# Patient Record
Sex: FEMALE | Race: BLACK OR AFRICAN AMERICAN
[De-identification: names, ages, dates, MRNs, and addresses within clinical notes are randomized per-mention and may not be internally consistent; named-entity substitution may affect disease eponyms.]

---

## 2017-04-03 ENCOUNTER — HOSPITAL ENCOUNTER (EMERGENCY)
Dept: HOSPITAL 61 - ER | Age: 42
Discharge: HOME | End: 2017-04-03
Payer: COMMERCIAL

## 2017-04-03 VITALS — WEIGHT: 210 LBS | HEIGHT: 66.5 IN | BODY MASS INDEX: 33.35 KG/M2

## 2017-04-03 VITALS — SYSTOLIC BLOOD PRESSURE: 130 MMHG | DIASTOLIC BLOOD PRESSURE: 85 MMHG
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
## 2017-04-03 DIAGNOSIS — R05: ICD-10-CM

## 2017-04-03 DIAGNOSIS — J39.2: ICD-10-CM

## 2017-04-03 DIAGNOSIS — T54.91XA: Primary | ICD-10-CM

## 2017-04-03 DIAGNOSIS — R09.81: ICD-10-CM

## 2017-04-03 PROCEDURE — 94640 AIRWAY INHALATION TREATMENT: CPT

## 2017-04-03 PROCEDURE — 99283 EMERGENCY DEPT VISIT LOW MDM: CPT

## 2017-04-03 NOTE — PHYS DOC
Past Medical History


Past Medical History:  Depression


Past Surgical History:  , Other


Additional Past Surgical Histo:  right wrist


Alcohol Use:  None


Drug Use:  None





Adult General


Chief Complaint


Chief Complaint:  COUGH





HPI


HPI


Patient is a 41  year old female who presents emergency room with complaint of 

cough, nasal congestion and throat irritation that seemed to worsen yesterday 

after being exposed to both bleach and ammonia in the home environment which 

one nose or open. Patient states that she has been performing some cleaning 

task at home, initially with bleach and then with ammonia. She denies mixing 

them directly together. However, she did use the same type of a  machine 

but flushed out the bleach solution before using the ammonia. Patient self 

denies any history of heart or lung disease. She denies any sensations of 

swelling in the back of her throat, difficulty swallowing or change in her 

speech.





Review of Systems


Review of Systems





Constitutional: Denies fever or chills []


Eyes: Denies change in visual acuity, redness, or eye pain []


HENT: Denies nasal congestion or sore throat []


Respiratory: Denies cough or shortness of breath []


Cardiovascular: No additional information not addressed in HPI []


GI: Denies abdominal pain, nausea, vomiting, bloody stools or diarrhea []


: Denies dysuria or hematuria []


Musculoskeletal: Denies back pain or joint pain []


Integument: Denies rash or skin lesions []


Neurologic: Denies headache, focal weakness or sensory changes []


Endocrine: Denies polyuria or polydipsia []





Current Medications


Current Medications








 Current Medications








 Medications


  (Trade)  Dose


 Ordered  Sig/Tristian  Start Time


 Stop Time Status Last Admin


Dose Admin


 


 Albuterol Sulfate


  (Ventolin Neb


 Soln)  2.5 mg  1X  ONCE  4/3/17 17:45


 4/3/17 17:46 DC 4/3/17 17:56


2.5 MG


 


 Prednisone


  (Prednisone)  40 mg  1X  ONCE  4/3/17 17:45


 4/3/17 17:46 DC 4/3/17 17:45


40 MG














Allergies


Allergies





 Allergies








Coded Allergies Type Severity Reaction Last Updated Verified


 


  No Known Drug Allergies    14 No











Physical Exam


Physical Exam





Constitutional: Well developed, well nourished, no acute distress, non-toxic 

appearance. []


HENT: Normocephalic, atraumatic, bilateral external ears normal, oropharynx 

moist, no oral exudates, nose normal. No angioedema


Eyes: PERRLA, EOMI, conjunctiva normal, no discharge. [] 


Neck: Normal range of motion, no tenderness, supple, no stridor. 


Cardiovascular:Heart rate regular rhythm, no murmur []


Lungs & Thorax: There is no respiratory distress respiratory fatigue. There is 

no posturing or sensory muscle use. Lungs are clear to auscultation bilaterally.


Abdomen: Bowel sounds normal, soft, no tenderness, no masses, no pulsatile 

masses. [] 


Skin: Warm, dry, no erythema, no rash.  


Back: No tenderness, no CVA tenderness. [] 


Extremities: No tenderness, no cyanosis, no clubbing, ROM intact, no edema. [] 


Neurologic: Alert and oriented X 3, normal motor function, normal sensory 

function, no focal deficits noted. []


Psychologic: Affect normal, judgement normal, mood normal. []





Current Patient Data


Vital Signs





 Vital Signs








  Date Time  Temp Pulse Resp B/P Pulse Ox O2 Delivery O2 Flow Rate FiO2


 


4/3/17 17:55      Room Air  


 


4/3/17 17:00 99.7 107 16 130/85 99   





 99.7       











EKG


EKG


[]





Radiology/Procedures


Radiology/Procedures


[]





Course & Med Decision Making


Course & Med Decision Making


Pertinent Labs and Imaging studies reviewed. (See chart for details)





[]





Dragon Disclaimer


Dragon Disclaimer


This electronic medical record was generated, in whole or in part, using a 

voice recognition dictation system.





Departure


Departure


Impression:  


 Primary Impression:  


 Inhalation of gaseous substance


Disposition:   HOME, SELF-CARE


Condition:  GOOD


Referrals:  


NO PCP (PCP)


Patient Instructions:  Chemical Inhalation





Additional Instructions:


1. There is no evidence of damage to his lungs at this time. The irritation is 

in your upper respiratory tract.


2. Take the medication as prescribed. Review the discharge instructions for self

-care and reasons to return to the emergency department.


3. Follow-up with your primary care doctor within the next 3-4 days, 

particularly if there are any concerns.


Scripts


Albuterol Sulfate (Proair Hfa Inhaler)8.5 Gm Hfa.aer.ad1 Puff INH PRN Q6HRS PRN 

SHORTNESS OF BREATH #1 INHALER  Ref 0


   Prov:WALTER SABILLON         4/3/17


Prednisone 20 Mg Tablet2 Tab PO DAILY 5 Days


   Prov:WALTER SABILLON         4/3/17


D-Methorphan Hb/Prometh Hcl (Promethazine-Dm Syrup)118 Ml Syrup5 Ml PO PRN 

Q6HRS COUGH #120 ML


   Prov:WALTER SABILLON         4/3/17








WALTER SABILLON Apr 3, 2017 17:32

## 2019-03-22 ENCOUNTER — HOSPITAL ENCOUNTER (EMERGENCY)
Dept: HOSPITAL 61 - ER | Age: 44
Discharge: HOME | End: 2019-03-22
Payer: COMMERCIAL

## 2019-03-22 VITALS — DIASTOLIC BLOOD PRESSURE: 73 MMHG | SYSTOLIC BLOOD PRESSURE: 120 MMHG

## 2019-03-22 VITALS — BODY MASS INDEX: 27.32 KG/M2 | HEIGHT: 66 IN | WEIGHT: 170 LBS

## 2019-03-22 DIAGNOSIS — Z98.890: ICD-10-CM

## 2019-03-22 DIAGNOSIS — F32.9: ICD-10-CM

## 2019-03-22 DIAGNOSIS — M54.5: Primary | ICD-10-CM

## 2019-03-22 PROCEDURE — 99283 EMERGENCY DEPT VISIT LOW MDM: CPT

## 2019-03-22 PROCEDURE — 96372 THER/PROPH/DIAG INJ SC/IM: CPT

## 2019-03-22 NOTE — PHYS DOC
Past Medical History


Past Medical History:  Depression


Past Surgical History:  , Other


Additional Past Surgical Histo:  right wrist


Alcohol Use:  None


Drug Use:  None





Adult General


Chief Complaint


Chief Complaint:  BACK PAIN - NO INJURY





[facility]


HPI





Patient is a 43-year-old female who presents with complaint of right-sided 

lower back pain that started yesterday. Patient states that pain started while 

she was just standing up. She states it felt like muscle twinge in her back and 

almost feels like she has a back spasm. She denies any injury and was not 

lifting when pain started. She denies any urinary discomfort. She denies any 

nausea, vomiting or diarrhea. Moderate. She states the pain is worsened with 

bending over in certain movements.





Review of Systems


Review of Systems





Constitutional: Denies fever or chills []


Respiratory: Denies cough or shortness of breath []


Cardiovascular: No additional information not addressed in HPI []


GI: Denies abdominal pain, nausea, vomiting, bloody stools or diarrhea []


: Denies dysuria or hematuria []


Musculoskeletal: Complains of lower back pain []


Integument: Denies rash or skin lesions []





Current Medications


Current Medications





Current Medications








 Medications


  (Trade)  Dose


 Ordered  Sig/Tristian  Start Time


 Stop Time Status Last Admin


Dose Admin


 


 Ketorolac


 Tromethamine


  (Toradol Im)  60 mg  1X  ONCE  3/22/19 03:45


 3/22/19 03:46 DC 3/22/19 04:17


60 MG


 


 Orphenadrine


 Citrate


  (Norflex)  60 mg  1X  ONCE  3/22/19 04:00


 3/22/19 04:01 DC 3/22/19 04:17


60 MG











Allergies


Allergies





Allergies








Coded Allergies Type Severity Reaction Last Updated Verified


 


  No Known Drug Allergies    14 No











Physical Exam


Physical Exam





Constitutional: Well developed, well nourished, no acute distress, non-toxic 

appearance. []


Neck: Normal range of motion, no tenderness, supple, no stridor. [] 


Cardiovascular: Regular rate and rhythm[]


Lungs & Thorax:  Bilateral breath sounds clear to auscultation []


Skin: Warm, dry, no erythema, no rash. [] 


Back: There is tenderness to palpation in the lower lumbar musculature on the 

right. [] 


Extremities: No tenderness, no cyanosis, no clubbing, ROM intact, no edema. []





Current Patient Data


Vital Signs





 Vital Signs








  Date Time  Temp Pulse Resp B/P (MAP) Pulse Ox O2 Delivery O2 Flow Rate FiO2


 


3/22/19 03:18 99.6 83 14 118/69 (85) 98 Room Air  





 99.6       











EKG


EKG


[]





Radiology/Procedures


Radiology/Procedures


[]





Course & Med Decision Making


Course & Med Decision Making


Pertinent Labs and Imaging studies reviewed. (See chart for details)





[]





Dragon Disclaimer


Dragon Disclaimer


This electronic medical record was generated, in whole or in part, using a 

voice recognition dictation system.





Departure


Departure


Impression:  


 Primary Impression:  


 Low back pain


Disposition:   HOME, SELF-CARE


Condition:  STABLE


Referrals:  


NO PCP (PCP)


Patient Instructions:  Back Pain, Adult


Scripts


Orphenadrine Citrate (ORPHENADRINE CITRATE) 100 Mg Tablet.er


1 TAB PO BID PRN for MUSCLE SPASMS, #14 TAB


   Prov: ILDA LEMUS Jr. DO         3/22/19 


Diclofenac Sodium (DICLOFENAC SODIUM) 50 Mg Tablet.dr


1 TAB PO BID PRN for PAIN, #60 TAB


   Prov: ILDA LEMUS Jr. DO         3/22/19 


Hydrocodone/Apap 5-325 (NORCO 5-325 TABLET) 1 Each Tablet


1-2 EACH PO PRN Q6HRS PRN for PAIN, #15


   as needed for pain


   Prov: ILDA LEMUS Jr. DO         3/22/19





Problem Qualifiers








 Primary Impression:  


 Low back pain


 Chronicity:  acute  Back pain laterality:  right  Sciatica presence:  without 

sciatica  Qualified Codes:  M54.5 - Low back pain








ILDA LEMUS Jr. DO Mar 22, 2019 05:19